# Patient Record
Sex: MALE | Race: WHITE | NOT HISPANIC OR LATINO
[De-identification: names, ages, dates, MRNs, and addresses within clinical notes are randomized per-mention and may not be internally consistent; named-entity substitution may affect disease eponyms.]

---

## 2020-01-29 ENCOUNTER — APPOINTMENT (OUTPATIENT)
Dept: OTOLARYNGOLOGY | Facility: CLINIC | Age: 55
End: 2020-01-29
Payer: COMMERCIAL

## 2020-01-29 VITALS
SYSTOLIC BLOOD PRESSURE: 132 MMHG | HEART RATE: 81 BPM | DIASTOLIC BLOOD PRESSURE: 64 MMHG | HEIGHT: 69 IN | BODY MASS INDEX: 33.18 KG/M2 | WEIGHT: 224 LBS

## 2020-01-29 DIAGNOSIS — H91.93 UNSPECIFIED HEARING LOSS, BILATERAL: ICD-10-CM

## 2020-01-29 DIAGNOSIS — H93.13 TINNITUS, BILATERAL: ICD-10-CM

## 2020-01-29 DIAGNOSIS — Z78.9 OTHER SPECIFIED HEALTH STATUS: ICD-10-CM

## 2020-01-29 DIAGNOSIS — R68.89 OTHER GENERAL SYMPTOMS AND SIGNS: ICD-10-CM

## 2020-01-29 DIAGNOSIS — Z86.39 PERSONAL HISTORY OF OTHER ENDOCRINE, NUTRITIONAL AND METABOLIC DISEASE: ICD-10-CM

## 2020-01-29 PROBLEM — Z00.00 ENCOUNTER FOR PREVENTIVE HEALTH EXAMINATION: Status: ACTIVE | Noted: 2020-01-29

## 2020-01-29 PROCEDURE — 92557 COMPREHENSIVE HEARING TEST: CPT

## 2020-01-29 PROCEDURE — 92567 TYMPANOMETRY: CPT

## 2020-01-29 PROCEDURE — 31575 DIAGNOSTIC LARYNGOSCOPY: CPT

## 2020-01-29 PROCEDURE — 99205 OFFICE O/P NEW HI 60 MIN: CPT | Mod: 25

## 2020-01-29 RX ORDER — ATORVASTATIN CALCIUM 20 MG/1
20 TABLET, FILM COATED ORAL
Refills: 0 | Status: ACTIVE | COMMUNITY

## 2020-01-29 NOTE — PHYSICAL EXAM
[] : septum deviated to the left [Midline] : trachea located in midline position [Laryngoscopy Performed] : laryngoscopy was performed, see procedure section for findings [Normal] : orientation to person, place, and time: normal

## 2020-01-29 NOTE — HISTORY OF PRESENT ILLNESS
[Obstructive Sleep Apnea] : Obstructive Sleep Apnea [Witnessed Apneas] : witnessed sleep apnea [Snoring] : snoring [Daytime Somnolence] : daytime somnolence [de-identified] : 54 year old male referred by Dr. Lexx Brennan, PCP, for nasal obstruction, throat clearing, obstructive sleep apnea, tinnitus, diminished hearing.  States has had a nasal obstruction for the past year.  States nasal congestion, post nasal drip, throat phlegm, throat clearing.   States has obstructive sleep apnea, diagnosed with sleep apnea 2 years ago, used a CPAP machine for awhile.  States his doctor told him to stop and that he doesn't need it.  States has had difficulty hearing for the past couple of years.  Denies hearing aids.  States bilateral tinnitus for at least 2-3 years.  Patient denies otalgia, otorrhea, ear infections, vertigo, headaches related to hearing. \par

## 2020-01-29 NOTE — REASON FOR VISIT
[Initial Evaluation] : an initial evaluation for [Spouse] : spouse [FreeTextEntry2] : referred by Dr. Lexx Brennan, PCP, for nasal obstruction, throat clearing, obstructive sleep apnea, tinnitus, diminished hearing

## 2020-01-29 NOTE — REVIEW OF SYSTEMS
[Ear Noises] : ear noises [Hearing Loss] : hearing loss [Snoring With Pauses] : snoring with pauses [Nasal Congestion] : nasal congestion [Problem Snoring] : problem snoring [Throat Clearing] : throat clearing [Hoarseness] : hoarseness [Heartburn] : heartburn [Negative] : Heme/Lymph [FreeTextEntry1] : noisy breathing, daytime sleepiness

## 2020-01-29 NOTE — PROCEDURE
[Unable to Cooperate with Mirror] : patient unable to cooperate with mirror [Image(s) Captured] : image(s) captured and filed [Complicated Symptoms] : complicated symptoms requiring more thorough examination than provided by mirror [Obstruction] : acute airway obstruction evaluation [Topical Lidocaine] : topical lidocaine [Flexible Endoscope] : examined with the flexible endoscope [Serial Number: ___] : Serial Number: [unfilled] [Oxymetazoline HCl] : oxymetazoline HCl [Velopharynx ___ %] : the velopharynx was [unfilled]U% collapsed [True Vocal Cords Paralysis] : no true vocal cord paralysis [True Vocal Cords Erythematous] : no true vocal cord edema [Normal] : the false vocal folds were pink and regular, the ventricular sulcus was open, the true vocal folds were glistening white, tense and of equal length, mobility, and height [True Vocal Cords Newsome's Nodules] : no true vocal cord nodules [Glottis Arytenoid Cartilages] : no arytenoid granulomas [Arytenoid Edema ___ /4] : bilaterally were normal [Arytenoid Erythema ___ /4] : bilaterally were normal [FreeTextEntry3] : + Arguello [de-identified] : Patient was placed in the examination chair in a sitting position. The nose was decongested with oxymetazoline nasal solution. The airway was anesthetized with 4% Xylocaine.  The back of the throat was anesthetized with Cetacaine. Direct flexible/rigid video endoscopy was performed. Findings revealed:\par The patient has severely deviated nasal septum completely obstructing the left nasal fossa partial obstruction on the right positive new maneuver on inspiration larynx normal epiglottis normal AE folds normal

## 2020-01-29 NOTE — CONSULT LETTER
[Dear  ___] : Dear  [unfilled], [Consult Letter:] : I had the pleasure of evaluating your patient, [unfilled]. [Please see my note below.] : Please see my note below. [Sincerely,] : Sincerely, [Consult Closing:] : Thank you very much for allowing me to participate in the care of this patient.  If you have any questions, please do not hesitate to contact me. [FreeTextEntry3] : Manoj Mendoza MD, MELLISSA, FACS\par  Department Otolaryngology\par Director of [facility]\par Professor of Otolaryngology, \par [employer] [facility]\par

## 2020-02-21 ENCOUNTER — RX CHANGE (OUTPATIENT)
Age: 55
End: 2020-02-21

## 2020-05-06 ENCOUNTER — APPOINTMENT (OUTPATIENT)
Dept: PULMONOLOGY | Facility: CLINIC | Age: 55
End: 2020-05-06

## 2020-05-19 ENCOUNTER — APPOINTMENT (OUTPATIENT)
Dept: OTOLARYNGOLOGY | Facility: HOSPITAL | Age: 55
End: 2020-05-19

## 2020-05-27 ENCOUNTER — APPOINTMENT (OUTPATIENT)
Dept: OTOLARYNGOLOGY | Facility: CLINIC | Age: 55
End: 2020-05-27

## 2020-07-23 ENCOUNTER — APPOINTMENT (OUTPATIENT)
Dept: PULMONOLOGY | Facility: CLINIC | Age: 55
End: 2020-07-23

## 2020-07-23 ENCOUNTER — APPOINTMENT (OUTPATIENT)
Dept: PULMONOLOGY | Facility: CLINIC | Age: 55
End: 2020-07-23
Payer: COMMERCIAL

## 2020-07-23 PROCEDURE — 99203 OFFICE O/P NEW LOW 30 MIN: CPT | Mod: GC,95

## 2020-07-23 NOTE — REASON FOR VISIT
[Home] : at home, [unfilled] , at the time of the visit. [Medical Office: ([facility])___] : at the medical office located in  [Spouse] : spouse [Verbal consent obtained from patient] : the patient, [unfilled] [Initial Evaluation] : an initial evaluation

## 2020-08-05 NOTE — REVIEW OF SYSTEMS
[Nasal Congestion] : nasal congestion [Witnessed Apneas] : witnessed apnea [Snoring] : snoring [Obesity] : obesity [Negative] : Psychiatric [Shortness Of Breath] : no shortness of breath [EDS: ESS=____] : no daytime somnolence [Difficulty Initiating Sleep] : no difficulty falling asleep [Acute Insomnia] : no acute insomnia [Difficulty Maintaining Sleep] : no difficulty maintaining sleep [Lower Extremity Discomfort] : no lower extremity discomfort [Late day/ Evening symptoms] : no late day/evening symptoms [Chronic Insomnia] : no chronic  insomnia [Unusual Sleep Behavior] : no unusual sleep behavior [Hypersomnolence] : not sleeping much more than usual [Sleep Paralysis] : no sleep paralysis [Cataplexy] :  no cataplexy

## 2020-08-05 NOTE — ASSESSMENT
[FreeTextEntry1] : 54M hx ELI on CPAP, HLD, GERD, obesity (BMI 33) who comes for initial evaluation of ELI. Prev diagnosed with severe ELI (AHI 31) 2-3 years ago. At home he uses the DreamStation on APAP but cannot tolerate it because of severely deviated septum. Currently he has episodes of snoring and witnessed apneic episodes. ESS 17. Has seen Dr. Mendoza for deviated septum, who recommended septoplasty and turbinectomy. Needs repeat study - will order for HST to eval severity of apnea. If positive, will treat with PAP therapy and see if pt can tolerate. If unable to tolerate, may require septoplasty/turbinectomy to help with nasal congestion and pap tolerance.  other therapies were discussed with the patient. further guidance will depend on results of current hst to determine current eli severity. \par \par \par Marisela Erazo MD\par Sleep Fellow

## 2020-08-05 NOTE — HISTORY OF PRESENT ILLNESS
[Obstructive Sleep Apnea] : obstructive sleep apnea [Snoring] : snoring [Awakes Unrefreshed] : awakening unrefreshed [Witnessed Apneas] : witnessed sleep apnea [Awakes with Headache] : headache upon awakening [ESS: ___] : ESS score [unfilled] [Unintentional Sleep While Inactive] : unintentional sleep while inactive [FreeTextEntry1] : 54M hx ELI on CPAP, HLD, GERD, who comes for initial evaluation of ELI. First diagnosed about 2-3 years ago with AHI 31 and was given a CPAP machine, but has not used it frequently because of nasal congestion (cannot breath out of his left nares) and subsequent SOB. At home uses Respironic Dream Station (?APAP) with a full face mask. Referred by Dr. Mendoza, who was seeing him for a severely deviated septum – may require septoplasty and turbinectomy. He was ordered for a PSG on 4/2020 (not done yet). \par \par Meds: atorvastatin\par \par Sleep schedule: 2AM to bed, sleep latency <30 min, 1 nighttime awakening to go to the bathroom, wakes up at 9AM, takes a nap after lunch for 30-40 min everyday  - feels refreshed afterwards. Occasional AM headaches. Has snoring and witnessed apneas. No gasping/choking in the middle of the night. Has drowsy driving if he does not take a nap during the day. No recent weight gain.\par  \par ESS: 17\par \par Of note, he had COVID 19 and given hydroxychloroquine and antibiotics for possible pneumonia. \par \par Works from 6PM-1AM.  [Frequent Nocturnal Awakening] : no nocturnal awakening [Unintentional Sleep while Active] : no unintentional sleep while active [Awakening With Dry Mouth] : no dry mouth upon awakening [Daytime Somnolence] : no daytime somnolence [Recent  Weight Gain] : no recent weight gain [DMS] : no DMS [Unusual Sleep Behavior] : no unusual sleep behavior [DIS] : no DIS [Sleep Paralysis] : no sleep paralysis [Hypnopompic Hallucinations] : no hallucinations when awakening [Hypnagogic Hallucinations] : no hallucinations when falling asleep

## 2020-09-03 ENCOUNTER — APPOINTMENT (OUTPATIENT)
Dept: OTOLARYNGOLOGY | Facility: CLINIC | Age: 55
End: 2020-09-03
Payer: COMMERCIAL

## 2020-09-03 VITALS
HEART RATE: 81 BPM | WEIGHT: 224 LBS | BODY MASS INDEX: 33.18 KG/M2 | DIASTOLIC BLOOD PRESSURE: 69 MMHG | HEIGHT: 69 IN | SYSTOLIC BLOOD PRESSURE: 127 MMHG

## 2020-09-03 DIAGNOSIS — J34.89 OTHER SPECIFIED DISORDERS OF NOSE AND NASAL SINUSES: ICD-10-CM

## 2020-09-03 PROCEDURE — 99214 OFFICE O/P EST MOD 30 MIN: CPT

## 2020-09-03 RX ORDER — FLUTICASONE PROPIONATE 50 UG/1
50 SPRAY, METERED NASAL
Qty: 48 | Refills: 3 | Status: DISCONTINUED | COMMUNITY
Start: 2020-01-29 | End: 2020-09-03

## 2020-09-03 NOTE — PHYSICAL EXAM
[de-identified] : Deviated septum to the left with displacement along the floor of the nose and at the caudal part of the septum [Normal] :  tongue is normal [de-identified] : Collapsed oropharynx

## 2020-09-03 NOTE — REASON FOR VISIT
[Subsequent Evaluation] : a subsequent evaluation for [FreeTextEntry2] : Difficulty breathing through the nose

## 2020-09-03 NOTE — HISTORY OF PRESENT ILLNESS
[de-identified] : 55 year old male Difficulty breathing through the nose.  States has obstructive sleep apnea, has not had a sleep study yet.  Denies nasal congestion, sinus pain, sinus pressure, post nasal discharge, nasal discharge.  Denies sinus infections in the past year.  [facility] tested positive for Covid 19 and tested positive for antibody end of March.  The patient complains that he cannot breathe at the present time and was wondering about the necessity for sleep study.  Has decided to see me since Dr. Mendoza does not accept his insurance

## 2020-11-11 ENCOUNTER — LABORATORY RESULT (OUTPATIENT)
Age: 55
End: 2020-11-11

## 2020-11-11 ENCOUNTER — APPOINTMENT (OUTPATIENT)
Dept: DISASTER EMERGENCY | Facility: CLINIC | Age: 55
End: 2020-11-11

## 2020-11-14 ENCOUNTER — APPOINTMENT (OUTPATIENT)
Dept: SLEEP CENTER | Facility: CLINIC | Age: 55
End: 2020-11-14
Payer: COMMERCIAL

## 2020-11-14 ENCOUNTER — OUTPATIENT (OUTPATIENT)
Dept: OUTPATIENT SERVICES | Facility: HOSPITAL | Age: 55
LOS: 1 days | End: 2020-11-14
Payer: COMMERCIAL

## 2020-11-14 PROCEDURE — 95810 POLYSOM 6/> YRS 4/> PARAM: CPT | Mod: 26

## 2020-11-14 PROCEDURE — 95810 POLYSOM 6/> YRS 4/> PARAM: CPT

## 2020-11-16 DIAGNOSIS — G47.33 OBSTRUCTIVE SLEEP APNEA (ADULT) (PEDIATRIC): ICD-10-CM

## 2020-11-30 ENCOUNTER — NON-APPOINTMENT (OUTPATIENT)
Age: 55
End: 2020-11-30

## 2020-12-07 ENCOUNTER — NON-APPOINTMENT (OUTPATIENT)
Age: 55
End: 2020-12-07

## 2020-12-22 ENCOUNTER — APPOINTMENT (OUTPATIENT)
Dept: OTOLARYNGOLOGY | Facility: CLINIC | Age: 55
End: 2020-12-22
Payer: COMMERCIAL

## 2020-12-22 VITALS
DIASTOLIC BLOOD PRESSURE: 76 MMHG | SYSTOLIC BLOOD PRESSURE: 122 MMHG | WEIGHT: 225 LBS | BODY MASS INDEX: 33.33 KG/M2 | HEART RATE: 80 BPM | HEIGHT: 69 IN

## 2020-12-22 DIAGNOSIS — G47.33 OBSTRUCTIVE SLEEP APNEA (ADULT) (PEDIATRIC): ICD-10-CM

## 2020-12-22 PROCEDURE — 99214 OFFICE O/P EST MOD 30 MIN: CPT

## 2020-12-22 PROCEDURE — 99072 ADDL SUPL MATRL&STAF TM PHE: CPT

## 2020-12-22 RX ORDER — CALCIUM CARBONATE 500 MG/1
TABLET, CHEWABLE ORAL
Refills: 0 | Status: DISCONTINUED | COMMUNITY
End: 2020-12-22

## 2020-12-22 RX ORDER — UBIDECARENONE 200 MG
CAPSULE ORAL
Refills: 0 | Status: ACTIVE | COMMUNITY

## 2020-12-22 RX ORDER — PANTOPRAZOLE 40 MG/1
40 TABLET, DELAYED RELEASE ORAL
Refills: 0 | Status: ACTIVE | COMMUNITY

## 2020-12-22 NOTE — HISTORY OF PRESENT ILLNESS
[de-identified] : 55 year old male follow up after sleep study 11/14/2020.   AHI 13.2.  States used Flonase but not consistently.  States feels it didn't really help with his nasal obstruction.  Still has difficulty breathing through his nose and feels that that is his major difficulty

## 2020-12-22 NOTE — REASON FOR VISIT
[Subsequent Evaluation] : a subsequent evaluation for [FreeTextEntry2] : follow up after sleep study 11/14/2020

## 2020-12-22 NOTE — PHYSICAL EXAM
[Normal] :  tongue is normal [de-identified] : Deviated septum to the left with displacement along the floor of the nose and at the caudal part of the septum [de-identified] : Collapsed oropharynx

## 2021-01-29 ENCOUNTER — APPOINTMENT (OUTPATIENT)
Dept: OTOLARYNGOLOGY | Facility: CLINIC | Age: 56
End: 2021-01-29

## 2021-01-29 VITALS
HEIGHT: 69 IN | BODY MASS INDEX: 33.33 KG/M2 | HEART RATE: 88 BPM | DIASTOLIC BLOOD PRESSURE: 71 MMHG | SYSTOLIC BLOOD PRESSURE: 116 MMHG | WEIGHT: 225 LBS

## 2021-01-29 RX ORDER — FLUTICASONE PROPIONATE 50 UG/1
50 SPRAY, METERED NASAL DAILY
Qty: 1 | Refills: 5 | Status: DISCONTINUED | COMMUNITY
Start: 2020-09-03 | End: 2021-01-29

## 2021-01-29 NOTE — HISTORY OF PRESENT ILLNESS
[de-identified] : 55 year old male referred by Dr. Mabry for obstructive sleep apnea, nasal obstruction, difficulty breathing through nose.  PSG 11/14/2020 AHI 13.2.  Currently using CPAP but having difficulties due to nasal obstruction.  Denies nasal congestion, sinus pain, sinus pressure, post nasal drip, nasal discharge, or sinus infections in the past year.

## 2021-01-29 NOTE — REASON FOR VISIT
[Subsequent Evaluation] : a subsequent evaluation for [FreeTextEntry2] : referred by Dr. Mabry for obstructive sleep apnea, nasal obstruction, difficulty breathing through nose

## 2021-01-29 NOTE — PROCEDURE
[Image(s) Captured] : image(s) captured and filed [Video Captured] : video captured and filed [Topical Lidocaine] : topical lidocaine [Oxymetazoline HCl] : oxymetazoline HCl [Flexible Endoscope] : examined with the flexible endoscope [Serial Number: ___] : Serial Number: [unfilled]

## 2021-02-02 ENCOUNTER — APPOINTMENT (OUTPATIENT)
Dept: OTOLARYNGOLOGY | Facility: CLINIC | Age: 56
End: 2021-02-02
Payer: COMMERCIAL

## 2021-02-02 VITALS
SYSTOLIC BLOOD PRESSURE: 122 MMHG | HEIGHT: 69 IN | OXYGEN SATURATION: 97 % | WEIGHT: 225 LBS | HEART RATE: 89 BPM | TEMPERATURE: 97.2 F | BODY MASS INDEX: 33.33 KG/M2 | DIASTOLIC BLOOD PRESSURE: 70 MMHG

## 2021-02-02 PROCEDURE — 31231 NASAL ENDOSCOPY DX: CPT

## 2021-02-02 PROCEDURE — 99072 ADDL SUPL MATRL&STAF TM PHE: CPT

## 2021-02-02 PROCEDURE — 99214 OFFICE O/P EST MOD 30 MIN: CPT | Mod: 25

## 2021-02-15 ENCOUNTER — OUTPATIENT (OUTPATIENT)
Dept: OUTPATIENT SERVICES | Facility: HOSPITAL | Age: 56
LOS: 1 days | End: 2021-02-15
Payer: COMMERCIAL

## 2021-02-15 ENCOUNTER — APPOINTMENT (OUTPATIENT)
Dept: CT IMAGING | Facility: CLINIC | Age: 56
End: 2021-02-15
Payer: COMMERCIAL

## 2021-02-15 DIAGNOSIS — Z00.8 ENCOUNTER FOR OTHER GENERAL EXAMINATION: ICD-10-CM

## 2021-02-15 PROCEDURE — 70486 CT MAXILLOFACIAL W/O DYE: CPT

## 2021-02-15 PROCEDURE — 70486 CT MAXILLOFACIAL W/O DYE: CPT | Mod: 26

## 2021-03-02 NOTE — HISTORY OF PRESENT ILLNESS
[de-identified] : 55M referred by Dr. Mabry for evaluation of nasal congestion and ELI\par Previously seen by Dr. Mendoza and Clotilde\par Prev PSG with AHI 13.2\par Unable to tolerate CPAP given issues breathing through nose\par Reports severe nasal congestion L>R, near constant\par Also w anterior rhinorrhea and PND with frequent throat clearing\par Long history of diminished sense of smell\par Chronic headaches but can't localize-- tends to improve w advil along

## 2021-03-12 ENCOUNTER — OUTPATIENT (OUTPATIENT)
Dept: OUTPATIENT SERVICES | Facility: HOSPITAL | Age: 56
LOS: 1 days | End: 2021-03-12
Payer: COMMERCIAL

## 2021-03-12 VITALS
WEIGHT: 227.96 LBS | HEIGHT: 69 IN | DIASTOLIC BLOOD PRESSURE: 102 MMHG | SYSTOLIC BLOOD PRESSURE: 148 MMHG | HEART RATE: 76 BPM | RESPIRATION RATE: 18 BRPM | OXYGEN SATURATION: 98 % | TEMPERATURE: 98 F

## 2021-03-12 DIAGNOSIS — I10 ESSENTIAL (PRIMARY) HYPERTENSION: ICD-10-CM

## 2021-03-12 DIAGNOSIS — J34.2 DEVIATED NASAL SEPTUM: ICD-10-CM

## 2021-03-12 DIAGNOSIS — R52 PAIN, UNSPECIFIED: Chronic | ICD-10-CM

## 2021-03-12 DIAGNOSIS — G47.30 SLEEP APNEA, UNSPECIFIED: ICD-10-CM

## 2021-03-12 DIAGNOSIS — G47.33 OBSTRUCTIVE SLEEP APNEA (ADULT) (PEDIATRIC): ICD-10-CM

## 2021-03-12 LAB
ANION GAP SERPL CALC-SCNC: 11 MMOL/L — SIGNIFICANT CHANGE UP (ref 7–14)
BUN SERPL-MCNC: 25 MG/DL — HIGH (ref 7–23)
CALCIUM SERPL-MCNC: 9.7 MG/DL — SIGNIFICANT CHANGE UP (ref 8.4–10.5)
CHLORIDE SERPL-SCNC: 102 MMOL/L — SIGNIFICANT CHANGE UP (ref 98–107)
CO2 SERPL-SCNC: 26 MMOL/L — SIGNIFICANT CHANGE UP (ref 22–31)
CREAT SERPL-MCNC: 1.25 MG/DL — SIGNIFICANT CHANGE UP (ref 0.5–1.3)
GLUCOSE SERPL-MCNC: 101 MG/DL — HIGH (ref 70–99)
HCT VFR BLD CALC: 47 % — SIGNIFICANT CHANGE UP (ref 39–50)
HGB BLD-MCNC: 15.1 G/DL — SIGNIFICANT CHANGE UP (ref 13–17)
MCHC RBC-ENTMCNC: 28.5 PG — SIGNIFICANT CHANGE UP (ref 27–34)
MCHC RBC-ENTMCNC: 32.1 GM/DL — SIGNIFICANT CHANGE UP (ref 32–36)
MCV RBC AUTO: 88.8 FL — SIGNIFICANT CHANGE UP (ref 80–100)
NRBC # BLD: 0 /100 WBCS — SIGNIFICANT CHANGE UP
NRBC # FLD: 0 K/UL — SIGNIFICANT CHANGE UP
PLATELET # BLD AUTO: 250 K/UL — SIGNIFICANT CHANGE UP (ref 150–400)
POTASSIUM SERPL-MCNC: 3.9 MMOL/L — SIGNIFICANT CHANGE UP (ref 3.5–5.3)
POTASSIUM SERPL-SCNC: 3.9 MMOL/L — SIGNIFICANT CHANGE UP (ref 3.5–5.3)
RBC # BLD: 5.29 M/UL — SIGNIFICANT CHANGE UP (ref 4.2–5.8)
RBC # FLD: 12.3 % — SIGNIFICANT CHANGE UP (ref 10.3–14.5)
SODIUM SERPL-SCNC: 139 MMOL/L — SIGNIFICANT CHANGE UP (ref 135–145)
WBC # BLD: 6.41 K/UL — SIGNIFICANT CHANGE UP (ref 3.8–10.5)
WBC # FLD AUTO: 6.41 K/UL — SIGNIFICANT CHANGE UP (ref 3.8–10.5)

## 2021-03-12 PROCEDURE — 93010 ELECTROCARDIOGRAM REPORT: CPT

## 2021-03-12 RX ORDER — SODIUM CHLORIDE 9 MG/ML
1000 INJECTION, SOLUTION INTRAVENOUS
Refills: 0 | Status: DISCONTINUED | OUTPATIENT
Start: 2021-03-22 | End: 2021-03-22

## 2021-03-12 RX ORDER — IBUPROFEN 200 MG
1 TABLET ORAL
Qty: 0 | Refills: 0 | DISCHARGE

## 2021-03-12 NOTE — H&P PST ADULT - NSICDXPASTMEDICALHX_GEN_ALL_CORE_FT
PAST MEDICAL HISTORY:  Chronic sinusitis     Deviated septum     Sleep apnea supposed to use device at night but doesn't     PAST MEDICAL HISTORY:  Chronic sinusitis     Deviated septum     Pain positive covid March 2020    Sleep apnea supposed to use device at night but doesn't     PAST MEDICAL HISTORY:  Chronic GERD     Chronic sinusitis     Deviated septum     Pain positive covid March 2020    Sleep apnea supposed to use device at night but doesn't

## 2021-03-12 NOTE — H&P PST ADULT - HISTORY OF PRESENT ILLNESS
This is a 56 y/o male who presents with ELI notified OR booking via fax of precautions, chronic sinusitis and deviated septum> Evaluated by MD with office scoping and CT scan confirming pathology. Scheduled for FESS, septoplasty, turbinate reduction This is a 56 y/o male who presents with ELI, chronic sinusitis,  and deviated septum. Evaluated by MD with office scoping and CT scan confirming pathology. Scheduled for FESS, septoplasty, turbinate reduction

## 2021-03-12 NOTE — H&P PST ADULT - NSICDXPROBLEM_GEN_ALL_CORE_FT
PROBLEM DIAGNOSES  Problem: Deviated septum  Assessment and Plan: This is  a 56 y/o male who is scheduled for functional endoscopic sinus surgery septoplasty turbinate reduction on 3-22-21  * Scheduled for covid testing  * Given preop instructions      Problem: Sleep apnea  Assessment and Plan: Notified OR booking via fax of sleep apnea    Problem: Hypertension  Assessment and Plan: Await medical evaluation with pcp due to elevated BP at PAST       PROBLEM DIAGNOSES  Problem: Hypertension  Assessment and Plan: Await medical evaluation with pcp due to elevated BP at PAST--notified surgeon via email    Problem: Sleep apnea  Assessment and Plan: Notified OR booking via fax of sleep apnea    Problem: Deviated septum  Assessment and Plan: This is  a 54 y/o male who is scheduled for functional endoscopic sinus surgery septoplasty turbinate reduction on 3-22-21  * Scheduled for covid testing  * Given preop instructions

## 2021-03-19 NOTE — ASU PATIENT PROFILE, ADULT - PMH
Chronic GERD    Chronic sinusitis    Deviated septum    Pain  positive covid March 2020  Sleep apnea  supposed to use device at night but doesn't

## 2021-03-19 NOTE — ASU PATIENT PROFILE, ADULT - IS PATIENT PREGNANT?
From: Jacque Marshall  To: Sharon Aguayo MD  Sent: 11/1/2019 12:53 PM CDT  Subject: Medication Question    Hello.  I have called multiple times since the 18th regarding medication refill and definitely nervous that I have been out and know to stop any medications can be dangerous. We had a family emergency and I am currently in [place]. That was noted as well with my pharmacy as well as your office. I have been told I would get a call back and never received one. Please give me an update as soon as possible. Thank you very much.    Jacque Marshall   not applicable (Male)

## 2021-03-21 ENCOUNTER — TRANSCRIPTION ENCOUNTER (OUTPATIENT)
Age: 56
End: 2021-03-21

## 2021-03-22 ENCOUNTER — APPOINTMENT (OUTPATIENT)
Dept: OTOLARYNGOLOGY | Facility: HOSPITAL | Age: 56
End: 2021-03-22

## 2021-03-22 ENCOUNTER — OUTPATIENT (OUTPATIENT)
Dept: OUTPATIENT SERVICES | Facility: HOSPITAL | Age: 56
LOS: 1 days | Discharge: HOME | End: 2021-03-22
Payer: COMMERCIAL

## 2021-03-22 ENCOUNTER — RESULT REVIEW (OUTPATIENT)
Age: 56
End: 2021-03-22

## 2021-03-22 VITALS
DIASTOLIC BLOOD PRESSURE: 73 MMHG | WEIGHT: 227.96 LBS | OXYGEN SATURATION: 97 % | HEIGHT: 69 IN | HEART RATE: 73 BPM | SYSTOLIC BLOOD PRESSURE: 129 MMHG | RESPIRATION RATE: 16 BRPM | TEMPERATURE: 98 F

## 2021-03-22 DIAGNOSIS — R52 PAIN, UNSPECIFIED: Chronic | ICD-10-CM

## 2021-03-22 DIAGNOSIS — G47.33 OBSTRUCTIVE SLEEP APNEA (ADULT) (PEDIATRIC): ICD-10-CM

## 2021-03-22 PROCEDURE — 30130 EXCISE INFERIOR TURBINATE: CPT | Mod: 50

## 2021-03-22 PROCEDURE — 99213 OFFICE O/P EST LOW 20 MIN: CPT

## 2021-03-22 PROCEDURE — 88311 DECALCIFY TISSUE: CPT | Mod: 26

## 2021-03-22 PROCEDURE — 30520 REPAIR OF NASAL SEPTUM: CPT

## 2021-03-22 PROCEDURE — 88304 TISSUE EXAM BY PATHOLOGIST: CPT | Mod: 26

## 2021-03-22 RX ORDER — UBIDECARENONE 100 MG
1 CAPSULE ORAL
Qty: 0 | Refills: 0 | DISCHARGE

## 2021-03-22 RX ORDER — OXYCODONE HYDROCHLORIDE 5 MG/1
10 TABLET ORAL EVERY 6 HOURS
Refills: 0 | Status: DISCONTINUED | OUTPATIENT
Start: 2021-03-22 | End: 2021-03-22

## 2021-03-22 RX ORDER — CEPHALEXIN 500 MG
500 CAPSULE ORAL EVERY 12 HOURS
Refills: 0 | Status: DISCONTINUED | OUTPATIENT
Start: 2021-03-22 | End: 2021-04-05

## 2021-03-22 RX ORDER — SODIUM CHLORIDE 9 MG/ML
1000 INJECTION, SOLUTION INTRAVENOUS
Refills: 0 | Status: DISCONTINUED | OUTPATIENT
Start: 2021-03-22 | End: 2021-04-05

## 2021-03-22 RX ORDER — ATORVASTATIN CALCIUM 80 MG/1
1 TABLET, FILM COATED ORAL
Qty: 0 | Refills: 0 | DISCHARGE

## 2021-03-22 RX ORDER — ATORVASTATIN CALCIUM 80 MG/1
20 TABLET, FILM COATED ORAL AT BEDTIME
Refills: 0 | Status: DISCONTINUED | OUTPATIENT
Start: 2021-03-22 | End: 2021-04-05

## 2021-03-22 RX ORDER — OXYCODONE HYDROCHLORIDE 5 MG/1
5 TABLET ORAL EVERY 6 HOURS
Refills: 0 | Status: DISCONTINUED | OUTPATIENT
Start: 2021-03-22 | End: 2021-03-22

## 2021-03-22 RX ORDER — ONDANSETRON 8 MG/1
4 TABLET, FILM COATED ORAL ONCE
Refills: 0 | Status: DISCONTINUED | OUTPATIENT
Start: 2021-03-22 | End: 2021-04-05

## 2021-03-22 RX ORDER — FENTANYL CITRATE 50 UG/ML
25 INJECTION INTRAVENOUS
Refills: 0 | Status: DISCONTINUED | OUTPATIENT
Start: 2021-03-22 | End: 2021-03-22

## 2021-03-22 RX ORDER — ACETAMINOPHEN 500 MG
650 TABLET ORAL EVERY 6 HOURS
Refills: 0 | Status: DISCONTINUED | OUTPATIENT
Start: 2021-03-22 | End: 2021-04-05

## 2021-03-22 RX ORDER — FENTANYL CITRATE 50 UG/ML
50 INJECTION INTRAVENOUS ONCE
Refills: 0 | Status: DISCONTINUED | OUTPATIENT
Start: 2021-03-22 | End: 2021-03-22

## 2021-03-22 RX ADMIN — OXYCODONE HYDROCHLORIDE 10 MILLIGRAM(S): 5 TABLET ORAL at 20:00

## 2021-03-22 RX ADMIN — SODIUM CHLORIDE 100 MILLILITER(S): 9 INJECTION, SOLUTION INTRAVENOUS at 20:00

## 2021-03-22 RX ADMIN — Medication 500 MILLIGRAM(S): at 18:55

## 2021-03-22 RX ADMIN — OXYCODONE HYDROCHLORIDE 10 MILLIGRAM(S): 5 TABLET ORAL at 18:55

## 2021-03-23 VITALS
RESPIRATION RATE: 14 BRPM | DIASTOLIC BLOOD PRESSURE: 50 MMHG | OXYGEN SATURATION: 100 % | TEMPERATURE: 98 F | HEART RATE: 90 BPM | SYSTOLIC BLOOD PRESSURE: 109 MMHG

## 2021-03-23 RX ORDER — OXYCODONE HYDROCHLORIDE 5 MG/1
1 TABLET ORAL
Qty: 12 | Refills: 0
Start: 2021-03-23 | End: 2021-03-25

## 2021-03-23 RX ORDER — IBUPROFEN 200 MG
1 TABLET ORAL
Qty: 0 | Refills: 0 | DISCHARGE

## 2021-03-23 RX ORDER — CEPHALEXIN 500 MG
1 CAPSULE ORAL
Qty: 14 | Refills: 0
Start: 2021-03-23 | End: 2021-03-29

## 2021-03-23 RX ADMIN — Medication 650 MILLIGRAM(S): at 06:30

## 2021-03-23 RX ADMIN — ATORVASTATIN CALCIUM 20 MILLIGRAM(S): 80 TABLET, FILM COATED ORAL at 00:50

## 2021-03-23 RX ADMIN — Medication 500 MILLIGRAM(S): at 05:59

## 2021-03-23 RX ADMIN — Medication 650 MILLIGRAM(S): at 05:59

## 2021-03-23 NOTE — ASU DISCHARGE PLAN (ADULT/PEDIATRIC) - NURSING INSTRUCTIONS
You received IV Tylenol for pain management at _06:00 AM__. Please DO NOT take any Tylenol (Acetaminophen) containing products, such as Vicodin, Percocet, Excedrin, and cold medications for the next 6 hours (until _12:00__ PM). DO NOT TAKE MORE THAN 3000 MG OF TYLENOL in a 24 hour period.  No strenuous exerciseing, aerobics or lifting for two weeks. Liquid and soft diet for the first 24 hours. Resume regular diet as tolerated. Nothing hot in temperature to eat or drink, avoid hot baths.  No nose blowing - sneeze with mouth open. Apply ice to reduce pain and swelling.

## 2021-03-23 NOTE — PROGRESS NOTE ADULT - SUBJECTIVE AND OBJECTIVE BOX
Patient seen and examined at bedside. No acute events overnight.     T(C): 36.8 (03-23-21 @ 07:00), Max: 36.8 (03-23-21 @ 07:00)  HR: 90 (03-23-21 @ 07:00) (73 - 98)  BP: 109/50 (03-23-21 @ 07:00) (99/50 - 131/65)  RR: 14 (03-23-21 @ 07:00) (11 - 21)  SpO2: 100% (03-23-21 @ 07:00) (94% - 100%)    NAD, awake and alert  Breathing comfortably on room air  No stridor/ stertor  NC: clear anteriorly  OC/OP: clear, wnl  Neck: soft, flat, no crepitus or hematoma    A/P:  55M s/p septoturbs 3/23  -keflex  -nasal saline sprays  -pain meds  -home this am

## 2021-03-23 NOTE — ASU DISCHARGE PLAN (ADULT/PEDIATRIC) - CARE PROVIDER_API CALL
Farooq Arango (MD)  Otolaryngology  [address], [address] [place] [address]  Phone: [phone]  Fax: [phone]  Follow Up Time:

## 2021-03-24 PROBLEM — G47.30 SLEEP APNEA, UNSPECIFIED: Chronic | Status: ACTIVE | Noted: 2021-03-12

## 2021-03-24 PROBLEM — R52 PAIN, UNSPECIFIED: Chronic | Status: ACTIVE | Noted: 2021-03-12

## 2021-03-24 PROBLEM — J32.9 CHRONIC SINUSITIS, UNSPECIFIED: Chronic | Status: ACTIVE | Noted: 2021-03-12

## 2021-03-24 PROBLEM — J34.2 DEVIATED NASAL SEPTUM: Chronic | Status: ACTIVE | Noted: 2021-03-12

## 2021-03-24 PROBLEM — K21.9 GASTRO-ESOPHAGEAL REFLUX DISEASE WITHOUT ESOPHAGITIS: Chronic | Status: ACTIVE | Noted: 2021-03-12

## 2021-03-25 LAB — SURGICAL PATHOLOGY STUDY: SIGNIFICANT CHANGE UP

## 2021-03-31 ENCOUNTER — APPOINTMENT (OUTPATIENT)
Dept: OTOLARYNGOLOGY | Facility: CLINIC | Age: 56
End: 2021-03-31
Payer: COMMERCIAL

## 2021-03-31 VITALS
DIASTOLIC BLOOD PRESSURE: 68 MMHG | BODY MASS INDEX: 33.77 KG/M2 | WEIGHT: 228 LBS | SYSTOLIC BLOOD PRESSURE: 124 MMHG | HEIGHT: 69 IN | HEART RATE: 90 BPM

## 2021-03-31 PROCEDURE — 99024 POSTOP FOLLOW-UP VISIT: CPT

## 2021-03-31 RX ORDER — DOXYCYCLINE HYCLATE 100 MG/1
100 CAPSULE ORAL
Qty: 14 | Refills: 0 | Status: ACTIVE | COMMUNITY
Start: 2021-03-31 | End: 1900-01-01

## 2021-03-31 RX ORDER — ACETAMINOPHEN 325 MG/1
TABLET, FILM COATED ORAL
Refills: 0 | Status: ACTIVE | COMMUNITY

## 2021-03-31 RX ORDER — PREDNISONE 10 MG/1
10 TABLET ORAL
Qty: 18 | Refills: 0 | Status: ACTIVE | COMMUNITY
Start: 2021-03-31 | End: 1900-01-01

## 2021-04-05 NOTE — HISTORY OF PRESENT ILLNESS
[de-identified] : 55 yr old M with chronic nasal congestion s/p septo, turbs, medial crural footplate reposition 3/22 presents for follow-up\par \par OPERATIONS\par 1. Septoplasty\par 2. Partial resection or repositioning of the medial crural footplate\par 3. Bilateral inferior turbinate reduction\par \par \par FINDINGS:Severe septal deviation with low spur on right side and high deflection to the left.  He had significant flaring of the left greater than right  medial crural footplate due to a caudal deformity on the left side.\par Path reviewed- c/w inflammation.\par \par Reports appropriate post-op pain, congestion\par Using nasal saline spray

## 2021-04-05 NOTE — PHYSICAL EXAM
[Nasal Endoscopy Performed] : nasal endoscopy was performed, see procedure section for findings [de-identified] : s [Midline] : trachea located in midline position [Normal] : no rashes

## 2021-04-05 NOTE — REASON FOR VISIT
[Post-Operative Visit] : a post-operative visit [Spouse] : spouse [FreeTextEntry2] : follow up s/p Septoplasty 3/22/21

## 2021-04-07 ENCOUNTER — NON-APPOINTMENT (OUTPATIENT)
Age: 56
End: 2021-04-07

## 2021-04-14 ENCOUNTER — APPOINTMENT (OUTPATIENT)
Dept: OTOLARYNGOLOGY | Facility: CLINIC | Age: 56
End: 2021-04-14
Payer: COMMERCIAL

## 2021-04-14 VITALS
DIASTOLIC BLOOD PRESSURE: 72 MMHG | BODY MASS INDEX: 33.77 KG/M2 | HEIGHT: 69 IN | TEMPERATURE: 98 F | WEIGHT: 228 LBS | HEART RATE: 83 BPM | SYSTOLIC BLOOD PRESSURE: 112 MMHG

## 2021-04-14 PROCEDURE — 99024 POSTOP FOLLOW-UP VISIT: CPT

## 2021-04-17 NOTE — REASON FOR VISIT
[Subsequent Evaluation] : a subsequent evaluation for [Other: _____] : [unfilled] [FreeTextEntry2] : follow up s/p septoplasty 3/22/21.

## 2021-04-17 NOTE — HISTORY OF PRESENT ILLNESS
[de-identified] : 55 year male with chronic nasal congestion s/p septo, turbs, medial crural footplate reposition 3/22 presents for follow-up\par LCV 4/7 at which time was noted to have signifiant swelling, started on PO pred, abx\par Subsequently with improved pain\par Intermittent congestion-- sometimes feels "100%" clear and then feels congested again\par Pain has resolved\par Using nasal saline\par \par OPERATIONS\par 1. Septoplasty\par 2. Partial resection or repositioning of the medial crural footplate\par 3. Bilateral inferior turbinate reduction

## 2021-05-12 ENCOUNTER — APPOINTMENT (OUTPATIENT)
Dept: OTOLARYNGOLOGY | Facility: CLINIC | Age: 56
End: 2021-05-12
Payer: COMMERCIAL

## 2021-05-12 VITALS
DIASTOLIC BLOOD PRESSURE: 72 MMHG | SYSTOLIC BLOOD PRESSURE: 123 MMHG | HEIGHT: 69 IN | HEART RATE: 83 BPM | BODY MASS INDEX: 34.36 KG/M2 | WEIGHT: 232 LBS

## 2021-05-12 PROCEDURE — 99213 OFFICE O/P EST LOW 20 MIN: CPT | Mod: 25

## 2021-05-12 PROCEDURE — 31231 NASAL ENDOSCOPY DX: CPT | Mod: 58

## 2021-05-12 NOTE — REASON FOR VISIT
[Subsequent Evaluation] : a subsequent evaluation for [FreeTextEntry2] : follow up after having  surgery for s/p septoplasty on 3/22/2021.

## 2021-05-12 NOTE — HISTORY OF PRESENT ILLNESS
[de-identified] : 55 year male with chronic nasal congestion s/p septo, turbs, medial crural footplate reposition 3/22 presents for follow-up\par LCV 4/14 at which time there was still some moderate edema \par Overall feels better with intermittent L-sided nasal congestion-- sometimes "feels perfect" and other times is very congested\par Unsure if he is snoring less-- has not restarted CPAP\par \par OPERATIONS\par 1. Septoplasty\par 2. Partial resection or repositioning of the medial crural footplate\par 3. Bilateral inferior turbinate reduction

## 2021-08-04 ENCOUNTER — APPOINTMENT (OUTPATIENT)
Dept: OTOLARYNGOLOGY | Facility: CLINIC | Age: 56
End: 2021-08-04
Payer: COMMERCIAL

## 2021-08-04 VITALS
HEART RATE: 85 BPM | WEIGHT: 225 LBS | BODY MASS INDEX: 33.33 KG/M2 | DIASTOLIC BLOOD PRESSURE: 70 MMHG | HEIGHT: 69 IN | SYSTOLIC BLOOD PRESSURE: 147 MMHG

## 2021-08-04 DIAGNOSIS — J34.2 DEVIATED NASAL SEPTUM: ICD-10-CM

## 2021-08-04 PROCEDURE — 31231 NASAL ENDOSCOPY DX: CPT

## 2021-08-04 PROCEDURE — 99214 OFFICE O/P EST MOD 30 MIN: CPT | Mod: 25

## 2021-08-04 RX ORDER — BACITRACIN 500 [IU]/G
500 OINTMENT TOPICAL
Qty: 1 | Refills: 3 | Status: ACTIVE | COMMUNITY
Start: 2021-08-04 | End: 1900-01-01

## 2021-08-04 NOTE — PHYSICAL EXAM
[Nasal Endoscopy Performed] : nasal endoscopy was performed, see procedure section for findings [Midline] : trachea located in midline position [Normal] : no rashes [de-identified] : INV collapse

## 2021-08-04 NOTE — HISTORY OF PRESENT ILLNESS
[de-identified] : 55 year old male hx chronic nasal congestion s/p septo, turbs, medial crural footplate reposition 03/22/21 presents for f/u\par LCV 05/12/21\par Resumed CPAP which is now working better than pre-op\par Congestion has improved, denies other sinonasal symptoms\par Sense of smell is poor\par Denies use of saline rinse\par \par OPERATIONS\par 1. Septoplasty\par 2. Partial resection or repositioning of the medial crural footplate\par 3. Bilateral inferior turbinate reduction

## 2022-09-19 NOTE — H&P PST ADULT - NSICDXPASTSURGICALHX_GEN_ALL_CORE_FT
Saucerization Depth: dermis and superficial adipose tissue PAST SURGICAL HISTORY:  Pain colonosoc;py

## 2022-09-20 ENCOUNTER — NON-APPOINTMENT (OUTPATIENT)
Age: 57
End: 2022-09-20

## 2022-09-22 ENCOUNTER — NON-APPOINTMENT (OUTPATIENT)
Age: 57
End: 2022-09-22

## 2022-10-25 ENCOUNTER — APPOINTMENT (OUTPATIENT)
Dept: PULMONOLOGY | Facility: CLINIC | Age: 57
End: 2022-10-25

## 2023-05-15 ENCOUNTER — APPOINTMENT (OUTPATIENT)
Dept: ORTHOPEDIC SURGERY | Facility: CLINIC | Age: 58
End: 2023-05-15
Payer: COMMERCIAL

## 2023-05-15 VITALS — HEIGHT: 69 IN | WEIGHT: 220 LBS | BODY MASS INDEX: 32.58 KG/M2

## 2023-05-15 DIAGNOSIS — M25.521 PAIN IN RIGHT ELBOW: ICD-10-CM

## 2023-05-15 PROCEDURE — 20551 NJX 1 TENDON ORIGIN/INSJ: CPT

## 2023-05-15 PROCEDURE — 73080 X-RAY EXAM OF ELBOW: CPT | Mod: RT

## 2023-05-15 PROCEDURE — 99203 OFFICE O/P NEW LOW 30 MIN: CPT | Mod: 25

## 2023-05-15 NOTE — HISTORY OF PRESENT ILLNESS
[Gradual] : gradual [0] : 0 [Localized] : localized [de-identified] : Has right elbow pain past few months. Has been lifting weights at the gym not sure if related. No neck pain or numbness in arm [] : no [FreeTextEntry1] : right elbow  [FreeTextEntry3] : 1 month  [FreeTextEntry5] : patient has had issues with the elbow in the past and has gotten cortisone inj.  [de-identified] : twisting motion

## 2023-05-15 NOTE — ASSESSMENT
[FreeTextEntry1] : Will inject lateral epicondyle with cortisone. Has started PT which is appropriate. I discussed with him the difficulty in getting this problem to go away, may return months after injection, should use forearm counterforce band and continue exercises once PT is done

## 2023-05-15 NOTE — PHYSICAL EXAM
[] : lateral elbow pain with resisted wrist extension [Right] : right elbow [There are no fractures, subluxations or dislocations. No significant abnormalities are seen] : There are no fractures, subluxations or dislocations. No significant abnormalities are seen

## 2023-05-15 NOTE — PROCEDURE
[Tendon Origin] : tendon origin [Right] : of the right [Lateral Epicondyle] : lateral epicondyle [Pain] : pain [Alcohol] : alcohol [Betadine] : betadine [Ethyl Chloride sprayed topically] : ethyl chloride sprayed topically [Sterile technique used] : sterile technique used [___ cc    3mg] :  Betamethasone (Celestone) ~Vcc of 3mg [___ cc    1%] : Lidocaine ~Vcc of 1%

## 2024-02-02 ENCOUNTER — APPOINTMENT (OUTPATIENT)
Dept: OTOLARYNGOLOGY | Facility: CLINIC | Age: 59
End: 2024-02-02
Payer: COMMERCIAL

## 2024-02-02 VITALS
HEART RATE: 86 BPM | TEMPERATURE: 95.7 F | SYSTOLIC BLOOD PRESSURE: 140 MMHG | OXYGEN SATURATION: 94 % | DIASTOLIC BLOOD PRESSURE: 82 MMHG

## 2024-02-02 DIAGNOSIS — G47.33 OBSTRUCTIVE SLEEP APNEA (ADULT) (PEDIATRIC): ICD-10-CM

## 2024-02-02 DIAGNOSIS — J32.9 CHRONIC SINUSITIS, UNSPECIFIED: ICD-10-CM

## 2024-02-02 PROCEDURE — 31231 NASAL ENDOSCOPY DX: CPT

## 2024-02-02 PROCEDURE — 99214 OFFICE O/P EST MOD 30 MIN: CPT | Mod: 25

## 2024-02-02 NOTE — REVIEW OF SYSTEMS
[Hearing Loss] : hearing loss [Ear Noises] : ear noises [As Noted in HPI] : as noted in HPI [Nasal Congestion] : nasal congestion [Sense Of Smell Problem] : sense of smell problem [Snoring With Pauses] : snoring with pauses [Negative] : Heme/Lymph

## 2024-02-02 NOTE — HISTORY OF PRESENT ILLNESS
[Nasal Congestion] : nasal congestion [de-identified] : Mr. PORTILLO is a 58 year male who presents with He reports that he is has difficulty breathing through his nose He is currently using a CPAP but reports that he tends to take it off in the middle of the night Has relief with breathing strips Increased nasal mucus production. Currently not using any nasal spray Also reports having tinnitus, difficulty hearing.

## 2024-05-14 ENCOUNTER — APPOINTMENT (OUTPATIENT)
Dept: OTOLARYNGOLOGY | Facility: CLINIC | Age: 59
End: 2024-05-14

## 2024-07-17 ENCOUNTER — APPOINTMENT (OUTPATIENT)
Dept: ORTHOPEDIC SURGERY | Facility: CLINIC | Age: 59
End: 2024-07-17

## 2024-10-14 ENCOUNTER — APPOINTMENT (OUTPATIENT)
Dept: ORTHOPEDIC SURGERY | Facility: CLINIC | Age: 59
End: 2024-10-14
Payer: COMMERCIAL

## 2024-10-14 VITALS — HEIGHT: 69 IN | BODY MASS INDEX: 34.66 KG/M2 | WEIGHT: 234 LBS

## 2024-10-14 DIAGNOSIS — G56.30 LESION OF RADIAL NERVE, UNSPECIFIED UPPER LIMB: ICD-10-CM

## 2024-10-14 DIAGNOSIS — M77.11 LATERAL EPICONDYLITIS, RIGHT ELBOW: ICD-10-CM

## 2024-10-14 PROCEDURE — 99214 OFFICE O/P EST MOD 30 MIN: CPT

## 2024-10-14 RX ORDER — FAMOTIDINE 10 MG/1
TABLET, FILM COATED ORAL
Refills: 0 | Status: ACTIVE | COMMUNITY

## 2024-10-14 RX ORDER — METHYLPREDNISOLONE 4 MG/1
4 TABLET ORAL
Qty: 1 | Refills: 0 | Status: ACTIVE | COMMUNITY
Start: 2024-10-14 | End: 1900-01-01

## 2024-10-28 ENCOUNTER — APPOINTMENT (OUTPATIENT)
Dept: MRI IMAGING | Facility: CLINIC | Age: 59
End: 2024-10-28
Payer: COMMERCIAL

## 2024-10-28 PROCEDURE — 73221 MRI JOINT UPR EXTREM W/O DYE: CPT | Mod: RT

## 2024-11-05 ENCOUNTER — APPOINTMENT (OUTPATIENT)
Dept: ORTHOPEDIC SURGERY | Facility: CLINIC | Age: 59
End: 2024-11-05
Payer: COMMERCIAL

## 2024-11-05 DIAGNOSIS — G56.30 LESION OF RADIAL NERVE, UNSPECIFIED UPPER LIMB: ICD-10-CM

## 2024-11-05 PROCEDURE — 99214 OFFICE O/P EST MOD 30 MIN: CPT

## 2024-11-05 RX ORDER — METHYLPREDNISOLONE 4 MG/1
4 TABLET ORAL
Qty: 1 | Refills: 0 | Status: ACTIVE | COMMUNITY
Start: 2024-11-05 | End: 1900-01-01
